# Patient Record
Sex: FEMALE | Race: WHITE
[De-identification: names, ages, dates, MRNs, and addresses within clinical notes are randomized per-mention and may not be internally consistent; named-entity substitution may affect disease eponyms.]

---

## 2018-08-10 ENCOUNTER — HOSPITAL ENCOUNTER (EMERGENCY)
Dept: HOSPITAL 45 - C.EDB | Age: 38
Discharge: STILL ADMITTED OR ADMINISTRATIVE | End: 2018-08-10
Payer: COMMERCIAL

## 2018-08-10 VITALS
DIASTOLIC BLOOD PRESSURE: 82 MMHG | SYSTOLIC BLOOD PRESSURE: 115 MMHG | HEART RATE: 65 BPM | TEMPERATURE: 97.16 F | OXYGEN SATURATION: 96 %

## 2018-08-10 VITALS
HEART RATE: 61 BPM | DIASTOLIC BLOOD PRESSURE: 88 MMHG | OXYGEN SATURATION: 97 % | SYSTOLIC BLOOD PRESSURE: 135 MMHG | TEMPERATURE: 97.34 F

## 2018-08-10 VITALS — OXYGEN SATURATION: 98 %

## 2018-08-10 VITALS
HEIGHT: 65.98 IN | HEIGHT: 65.98 IN | BODY MASS INDEX: 36.53 KG/M2 | WEIGHT: 227.3 LBS | WEIGHT: 227.3 LBS | BODY MASS INDEX: 36.53 KG/M2

## 2018-08-10 DIAGNOSIS — O03.9: Primary | ICD-10-CM

## 2018-08-10 DIAGNOSIS — Z88.2: ICD-10-CM

## 2018-08-10 DIAGNOSIS — Z88.8: ICD-10-CM

## 2018-08-10 LAB
ALBUMIN SERPL-MCNC: 3.6 GM/DL (ref 3.4–5)
ALP SERPL-CCNC: 45 U/L (ref 45–117)
ALT SERPL-CCNC: 37 U/L (ref 12–78)
AST SERPL-CCNC: 22 U/L (ref 15–37)
BASOPHILS # BLD: 0.02 K/UL (ref 0–0.2)
BASOPHILS NFR BLD: 0.2 %
BUN SERPL-MCNC: 15 MG/DL (ref 7–18)
CALCIUM SERPL-MCNC: 9.1 MG/DL (ref 8.5–10.1)
CO2 SERPL-SCNC: 24 MMOL/L (ref 21–32)
CREAT SERPL-MCNC: 0.92 MG/DL (ref 0.6–1.2)
EOS ABS #: 0.22 K/UL (ref 0–0.5)
EOSINOPHIL NFR BLD AUTO: 238 K/UL (ref 130–400)
GLUCOSE SERPL-MCNC: 83 MG/DL (ref 70–99)
HCT VFR BLD CALC: 40.7 % (ref 37–47)
HGB BLD-MCNC: 14.1 G/DL (ref 12–16)
IG#: 0.02 K/UL (ref 0–0.02)
IMM GRANULOCYTES NFR BLD AUTO: 20.6 %
INR PPP: 0.9 (ref 0.9–1.1)
LYMPHOCYTES # BLD: 1.77 K/UL (ref 1.2–3.4)
MCH RBC QN AUTO: 31.3 PG (ref 25–34)
MCHC RBC AUTO-ENTMCNC: 34.6 G/DL (ref 32–36)
MCV RBC AUTO: 90.2 FL (ref 80–100)
MONO ABS #: 0.76 K/UL (ref 0.11–0.59)
MONOCYTES NFR BLD: 8.8 %
NEUT ABS #: 5.8 K/UL (ref 1.4–6.5)
NEUTROPHILS # BLD AUTO: 2.6 %
NEUTROPHILS NFR BLD AUTO: 67.6 %
PMV BLD AUTO: 8.9 FL (ref 7.4–10.4)
POTASSIUM SERPL-SCNC: 3.7 MMOL/L (ref 3.5–5.1)
PROT SERPL-MCNC: 7 GM/DL (ref 6.4–8.2)
PTT PATIENT: 25.6 SECONDS (ref 21–31)
RED CELL DISTRIBUTION WIDTH CV: 12.9 % (ref 11.5–14.5)
RED CELL DISTRIBUTION WIDTH SD: 42.7 FL (ref 36.4–46.3)
SODIUM SERPL-SCNC: 138 MMOL/L (ref 136–145)
WBC # BLD AUTO: 8.59 K/UL (ref 4.8–10.8)

## 2018-08-10 NOTE — OPERATIVE REPORT
DATE OF ADMISSION:  08/10/2018

 

PREOPERATIVE DIAGNOSIS:  Incomplete .

 

POSTOPERATIVE DIAGNOSIS:  Same.

 

PROCEDURE:  Dilatation and evacuation.

 

SURGEON:  Dave Mcmahon MD

 

ASSISTANT:  None.

 

ESTIMATED BLOOD LOSS:  10 mL.

 

TOTAL FLUIDS:  900 mL.

 

URINE OUTPUT:  200 mL.

 

FINDINGS:  Products of conception.

 

CLINICAL HISTORY:  The patient is a 38-year-old female approximately 7-8

weeks pregnant who was seen in the ER for heavy vaginal bleeding.  The

patient had an ultrasound last week revealing no fetal heart tones with a

diagnosis of a failed early pregnancy.  She was managed expectantly.  She

came into the ER bleeding heavily.  Her hCG was still positive.  An

ultrasound revealed evidence of retained products.  Patient was brought to

the OR for dilatation and evacuation.  Time-out was called prior to the start

of the procedure and no antibiotics were given.

 

DESCRIPTION OF PROCEDURE:  After satisfactory general anesthesia, the patient

was prepped and draped in the usual sterile fashion.  She was placed in the

dorsal lithotomy position.  Catheter was used to empty the bladder of 200 mL

of clear urine.  Exam under anesthesia revealed the uterus to be

approximately 8 weeks size.  The cervix was open.  A #8 curved curette was

introduced suctioning out and curetting out products of conception and a

sharp endometrial curette was then introduced curetting out minimal amounts

of tissue.  No active bleeding was noted at the end of the procedure. 

Pitocin was started in the IV.  At the end of the procedure, all remaining

sponge and instrument counts were correct.  All instruments were removed from

the vagina.  Estimated blood loss 10 mL.  Products of conception submitted to

pathology as a separate specimen.  The patient was then placed upon a

stretcher and taken to recovery room in stable condition where she will be

discharged later today.

## 2018-08-10 NOTE — ANESTHESIOLOGY PROGRESS NOTE
Anesthesia Post Op Note


Date & Time


Aug 10, 2018 at 23:07





Vital Signs


Pain Intensity:  4





Vital Signs Past 12 Hours








  Date Time  Temp Pulse Resp B/P (MAP) Pulse Ox O2 Delivery O2 Flow Rate FiO2


 


8/10/18 23:00  61 18 113/76 99 Room Air  


 


8/10/18 22:50  67 18 122/76 99 Room Air  


 


8/10/18 22:40  80 16 123/79 99 Room Air  


 


8/10/18 22:34 36.2 79 16 141/78 99 Room Air  


 


8/10/18 20:49  84 18 114/65 98   


 


8/10/18 18:58  68 18 116/70 97 Room Air  


 


8/10/18 17:19  75  105/76 99 Room Air  


 


8/10/18 17:18     100 Room Air  


 


8/10/18 13:59 36.6 84 18 134/88 98 Room Air  











Notes


Mental Status:  alert / awake / arousable, participated in evaluation


Pt Amnestic to Procedure:  Yes


Nausea / Vomiting:  adequately controlled


Pain:  adequately controlled


Airway Patency, RR, SpO2:  stable & adequate


BP & HR:  stable & adequate


Hydration State:  stable & adequate


Anesthetic Complications:  no major complications apparent

## 2018-08-10 NOTE — [facility] POST OPERATIVE BRIEF NOTE
Immediate Operative Summary


Operative Date


Aug 10, 2018.





Pre-Operative Diagnosis





Incomplete 





Post-Operative Diagnosis





Incomplete 





Procedure(s) Performed





Dilation and Evacuation





Surgeon


Dr. Mcmahon





Assistant Surgeon(s)


None





Estimated Blood Loss


10cc





Findings


Consistent with Post-Op Diagnosis





Specimens





A. Products of conception





Drains


None





Anesthesia Type


General





Complication(s)


none





Disposition


Accompanied Pt To Recover:  yes


Disposition:  Recovery Room / PACU





Overlapping Procedure


I was present for:  the critical portions of procedure.


I was immediately available:  during the entire case


Back up surgeon:  was not required during procedure

## 2018-08-10 NOTE — DISCHARGE INSTRUCTIONS
Discharge Instructions


Date of Service


Aug 10, 2018.





Admission


Reason for Admission:  Excessive Bleeding Due To Miscarriage





Discharge


Discharge Diagnosis / Problem:  incomplete 





Discharge Goals


Goal(s):  Routine recovery after surgery





Activity Recommendations


Activity Limitations:  as noted below


Lifting Limitations:  no more than 10 pounds


Exercise/Sports Limitations:  gradually increase as tolerated


May Resume Sexual Activity:  after follow-up appointment


Shower/Bathe:  no limitations


Driving or Machine Use:  resume 1 day after discharge





.





Current Hospital Diet





ACTIVITY RECOMMENDATIONS:





*  Avoid tampons, douching, hot tubs, pools, and intercourse until bleeding has 

stopped.





*  May shower as usual.





*  No strenuous activity for 24-48 hours. After 24-48 hours, you can do 

anything you feel 


   like doing (driving and sports are okay).








RETURN TO SCHOOL/WORK:





*  You may return to school or work after 24 hours unless specified by your 

physician.








DIET:





*  Resume previous diet.








MEDICATIONS:





Resume previous medications unless instructed otherwise by your surgeon.





Ibuprofen 200mg 2-3 tablets every 4-6 hours as needed


    --OR--


Aleve 2 tablets every 8-12 hours as needed for post-operative discomfort





Medications are over the counter. Tylenol may be used if above medications


are contraindicated or not preferred. Medication should be taken with food or


milk. do not take on an empty stomach.








SPECIAL CARE INSTRUCTIONS:





*  Check temperature twice daily for one week. Report any elevation over 101 

degrees.





*  Call office if you experience increased pelvic pain or discomfort not 

relieved by pain


   medicine, if you have foul smelling vaginal discharge, if you have bleeding 

that is 


   heavier than a normal menstrual flow. If you are changing a maxi pad every 1-

2 hours,


   this is too heavy. vaginal spotting is normal for 1-2 weeks.








FOLLOW UP VISIT:





Call your doctor's office for a post-operative visit.





Patient's current hospital diet:





Discharge Diet


Recommended Diet:  Regular Diet


Fluid Restriction:  None





Procedures


Procedures Performed:  


Dilation and Evacuation





Pending Studies


Studies pending at discharge:  no





Medical Emergencies








.


Who to Call and When:





Medical Emergencies:  If at any time you feel your situation is an emergency, 

please call 911 immediately.





.





Non-Emergent Contact


Non-Emergency issues call your:  Primary Care Provider





.


.








"Provider Documentation" section prepared by Dave Mcmahon.








.

## 2018-08-10 NOTE — EMERGENCY ROOM VISIT NOTE
ED Visit Note


First contact with patient:  14:36


CHIEF  COMPLAINT: Heavy vaginal bleeding, miscarriage





HISTORY OF PRESENTING ILLNESS: This is a 38-year-old female  who presented 

to the emergency department with heavy vaginal bleeding that started at around 

11 AM today.  She states that she believes she is having a miscarriage.  She 

states that she is about 7-8 weeks pregnant, had an ultrasound 1 week ago on 8/

3 that showed no fetal heart activity, and her OB suspected fetal demise.  She 

states that she has been having light spotting for over a week, which started 

to become heavier about 5 days ago with cramping and passing small clots.  

Today she began to experience large gushes of blood and large clots, states she 

is soaking her pad every 20 minutes.  She states she called her OB office and 

they advised her to come to the emergency department for further evaluation.  

She is having significant abdominal cramping and pelvic pain, at its worst she 

has rated at 8/10, though she states it has subsided and is currently rating as 

3/10.  She denies any fevers, chills, or malaise.  She denies any chest pain, 

shortness of breath, dizziness or syncope.





REVIEW OF SYSTEMS: A complete 10 point review of systems was reviewed with the 

patient with pertinent positives and negatives as per history of present 

illness. All else were negative.





PAST MEDICAL HISTORY: Hypothyroidism.





SOCIAL HISTORY: Lives at home.  She denies tobacco use.





ALLERGIES: Reviewed in chart, see below.





PHYSICAL EXAM:


CONSTITUTIONAL: Pleasant and cooperative.  No acute distress.  No pallor.  

Mildly dehydrated, but otherwise well appearing and well nourished. 


HEENT: Normocephalic, atraumatic. Pupils equal, round and reactive to light, 

EOMI. TMs normal. Pharynx normal.  Tacky mucous membranes.


NECK: Supple, full active range of motion without discomfort.


RESPIRATORY: Clear to auscultation bilaterally with no wheezing, crackles, 

rhonchi or stridor. Equal expansion bilaterally.


CARDIOVASCULAR: Regular rate and rhythm with no murmurs, rubs or gallops. 

Normal peripheral perfusion. No edema.


GASTROINTESTINAL: Soft, nontender, nondistended. No palpable masses or HSM. 

Bowel sounds present in all quadrants. 


PELVIC EXAM:  VULVA: No ulcers, vesicles or atrophy.  VAGINA: Moderate amount 

of dark red blood and clots in the vaginal vault, no foul odor, no abnormal 

discharge.  CERVIX: Os is open to 2 finger-widths, no clots or tissue noted in 

the os, active bleeding from the os.  Cervix is pink, nontender, no cervical 

motion tenderness, no discharge.  UTERUS: Slightly enlarged, mildly tender to 

palpation.  ADNEXA: No masses or tenderness.  A nurse was present as a 

chaperone during the examination.


MUSCULOSKELETAL: Full range of motion of all joints without discomfort.


INTEGUMENTARY: No rash or other significant dermatologic conditions noted.


NEUROLOGIC: Alert and oriented X 4 with normal affect.  Normal strength and 

sensation in all 4 extremities.  No focal neurologic deficits noted.  Normal 

speech.  Normal gait observed.





ED COURSE AND MEDICAL DECISION MAKING: 





CC: Patient presenting with complaint of heavy vaginal bleeding,  suspected 

miscarriage





DIFFERENTIAL DIAGNOSIS:  Includes, but not limited to incomplete miscarriage 

versus complete miscarriage, retained product of conception, anemia, dehydration

, among others. 





INTERPRETATION OF LABS: No leukocytosis, no anemia, normal platelets, no 

significant electrolyte abnormalities, normal renal function, normal liver and.

  Coagulation factors within normal limits.  Beta hCG quant significantly 

elevated.  UA notable for blood, negative for infection.





IMAGING:  


PELVIC ULTRASOUND





CLINICAL HISTORY: Evaluate for retained products of conception. Miscarriage.    





COMPARISON STUDY:  No previous studies for comparison.





TECHNIQUE: Transabdominal and transvaginal sonography of the pelvis was


performed.





FINDINGS: The uterus measures 9.5 x 5.7 x 7.2 cm. The endometrium is thickened


and heterogeneous, measuring 2.1 cm in thickness. There is increased vascularity


within the endometrium. No normal-appearing intrauterine gestational sac is


noted. Several echogenic foci within the endometrium are noted. The ovaries are


normal. The right measures 3.3 x 2.4 x 2.2 cm the left measures 2.6 x 1.8 x 1.5


cm. There is color flow within each ovary. Small amount of free fluid within the


pelvis is noted.





IMPRESSION:  Thickened, hypervascular and heterogeneous endometrium which


measures 2.1 cm. The findings suggest retained products of conception.








MEDICATION RECONCILIATION:  I attest that I have personally reviewed the patient

's current medication list.





INITIAL VITAL SIGNS REVIEW:  I reviewed the patient's initial vital signs and 

interpret them as follows: T: Afebrile;  BP: Hypertensive;  HR: Within normal 

limits;  RR: Within normal limits;  Pulse Ox: Within normal limits on room air.


Blood pressure screening: The patient was found to have an elevated blood 

pressure, which was felt to be situational.  





SUMMARY: 


Patient was evaluated at bedside, history and physical exam performed.


Patient is alert and oriented, in no acute distress, resting calmly in 

stretcher.


Patient is not tachycardic and does not appear to be pale on exam.


Mild tenderness to palpation in the lower abdomen/pelvic area, abdomen is 

otherwise nontender.  No acute abdomen.


Review of the patient's chart was performed, noting blood type A positive.


Orders were placed at bedside for labs, UA, IV fluids for hydration, 

transvaginal ultrasound to evaluate for retained products of conception/

incomplete miscarriage.


Patient discussed with Dr. Connolly, who agrees with my assessment and plan.


Pelvic exam was performed as above, noting an open cervix with moderate amount 

of blood and clots in the vaginal vault, no active hemorrhaging.


Labs and imaging reviewed as above.  Ultrasound consistent with retained 

products of conception.


I spoke on the phone with Dr. Mcmahon, OB/GYN, who agrees to evaluate the patient 

and will most likely take her to the OR for D&C today.  Type and screen ordered 

per his request


Patient reassessed multiple times throughout ED stay, she has remained stable, 

and her pain is well controlled.


Patient was updated on all results and plan for admission and surgery, she 

verbalized understanding and was agreeable to this plan.


Patient was stable at time of admission.


Current/Historical Medications


Scheduled


Acetaminophen (Tylenol), 1 TAB PO Q8


Ibuprofen (Ibuprofen), 1 TAB PO Q6H


Levothyroxine Sodium (Levothyroxine Sodium), 150 MCG PO DAILY





Scheduled PRN


Ranitidine (Zantac), 1 TAB PO BID PRN for HEART BURN





Allergies


Coded Allergies:  


     Varenicline (Verified  Allergy, Mild, HYSTERIA, 8/10/18)


     Cephalexin (Verified  Allergy, Unknown, SWELLING, 8/10/18)


     Sulfa Antibiotics (Verified  Allergy, Unknown, RASH, 8/10/18)





Vital Signs











  Date Time  Temp Pulse Resp B/P (MAP) Pulse Ox O2 Delivery O2 Flow Rate FiO2


 


8/10/18 23:17 36.2 65 16 115/82 96 Room Air  


 


8/10/18 23:10 36.4 62 18 122/84 94 Room Air  


 


8/10/18 23:00  61 18 113/76 99 Room Air  


 


8/10/18 22:50  67 18 122/76 99 Room Air  


 


8/10/18 22:40  80 16 123/79 99 Room Air  


 


8/10/18 22:34 36.2 79 16 141/78 99 Room Air  


 


8/10/18 20:49  84 18 114/65 98   


 


8/10/18 18:58  68 18 116/70 97 Room Air  


 


8/10/18 17:19  75  105/76 99 Room Air  


 


8/10/18 17:18     100 Room Air  


 


8/10/18 13:59 36.6 84 18 134/88 98 Room Air  











Laboratory Results


8/10/18 15:15








Red Blood Count 4.51, Mean Corpuscular Volume 90.2, Mean Corpuscular Hemoglobin 

31.3, Mean Corpuscular Hemoglobin Concent 34.6, Mean Platelet Volume 8.9, 

Neutrophils (%) (Auto) 67.6, Lymphocytes (%) (Auto) 20.6, Monocytes (%) (Auto) 

8.8, Eosinophils (%) (Auto) 2.6, Basophils (%) (Auto) 0.2, Neutrophils # (Auto) 

5.80, Lymphocytes # (Auto) 1.77, Monocytes # (Auto) 0.76, Eosinophils # (Auto) 

0.22, Basophils # (Auto) 0.02





8/10/18 15:15

















Test


  8/10/18


15:15 8/10/18


17:15


 


White Blood Count


  8.59 K/uL


(4.8-10.8) 


 


 


Red Blood Count


  4.51 M/uL


(4.2-5.4) 


 


 


Hemoglobin


  14.1 g/dL


(12.0-16.0) 


 


 


Hematocrit 40.7 % (37-47)  


 


Mean Corpuscular Volume


  90.2 fL


() 


 


 


Mean Corpuscular Hemoglobin


  31.3 pg


(25-34) 


 


 


Mean Corpuscular Hemoglobin


Concent 34.6 g/dl


(32-36) 


 


 


Platelet Count


  238 K/uL


(130-400) 


 


 


Mean Platelet Volume


  8.9 fL


(7.4-10.4) 


 


 


Neutrophils (%) (Auto) 67.6 %  


 


Lymphocytes (%) (Auto) 20.6 %  


 


Monocytes (%) (Auto) 8.8 %  


 


Eosinophils (%) (Auto) 2.6 %  


 


Basophils (%) (Auto) 0.2 %  


 


Neutrophils # (Auto)


  5.80 K/uL


(1.4-6.5) 


 


 


Lymphocytes # (Auto)


  1.77 K/uL


(1.2-3.4) 


 


 


Monocytes # (Auto)


  0.76 K/uL


(0.11-0.59) 


 


 


Eosinophils # (Auto)


  0.22 K/uL


(0-0.5) 


 


 


Basophils # (Auto)


  0.02 K/uL


(0-0.2) 


 


 


RDW Standard Deviation


  42.7 fL


(36.4-46.3) 


 


 


RDW Coefficient of Variation


  12.9 %


(11.5-14.5) 


 


 


Immature Granulocyte % (Auto) 0.2 %  


 


Immature Granulocyte # (Auto)


  0.02 K/uL


(0.00-0.02) 


 


 


Prothrombin Time


  9.4 SECONDS


(9.0-12.0) 


 


 


Prothromb Time International


Ratio 0.9 (0.9-1.1) 


  


 


 


Activated Partial


Thromboplast Time 25.6 SECONDS


(21.0-31.0) 


 


 


Partial Thromboplastin Ratio 1.0  


 


Anion Gap


  9.0 mmol/L


(3-11) 


 


 


Est Creatinine Clear Calc


Drug Dose 99.5 ml/min 


  


 


 


Estimated GFR (


American) 90.9 


  


 


 


Estimated GFR (Non-


American 78.4 


  


 


 


BUN/Creatinine Ratio 16.8 (10-20)  


 


Calcium Level


  9.1 mg/dl


(8.5-10.1) 


 


 


Total Bilirubin


  0.6 mg/dl


(0.2-1) 


 


 


Aspartate Amino Transf


(AST/SGOT) 22 U/L (15-37) 


  


 


 


Alanine Aminotransferase


(ALT/SGPT) 37 U/L (12-78) 


  


 


 


Alkaline Phosphatase


  45 U/L


() 


 


 


Total Protein


  7.0 gm/dl


(6.4-8.2) 


 


 


Albumin


  3.6 gm/dl


(3.4-5.0) 


 


 


Globulin


  3.4 gm/dl


(2.5-4.0) 


 


 


Albumin/Globulin Ratio 1.1 (0.9-2)  


 


Human Chorionic Gonadotropin,


Quant 30170 mIU/mL 


  


 


 


Urine Color  YELLOW 


 


Urine Appearance  CLEAR (CLEAR) 


 


Urine pH  6.0 (4.5-7.5) 


 


Urine Specific Gravity


  


  1.010


(1.000-1.030)


 


Urine Protein  NEG (NEG) 


 


Urine Glucose (UA)  NEG (NEG) 


 


Urine Ketones  NEG (NEG) 


 


Urine Occult Blood  3+ (NEG) 


 


Urine Nitrite  NEG (NEG) 


 


Urine Bilirubin  NEG (NEG) 


 


Urine Urobilinogen  NEG (NEG) 


 


Urine Leukocyte Esterase  NEG (NEG) 


 


Urine WBC (Auto)  0 /hpf (0-5) 


 


Urine RBC (Auto)  >30 /hpf (0-4) 


 


Urine Hyaline Casts (Auto)  1-5 /lpf (0-5) 


 


Urine Epithelial Cells (Auto)


  


  10-20 /lpf


(0-5)


 


Urine Bacteria (Auto)  NEG (NEG) 











Medications Administered











 Medications


  (Trade)  Dose


 Ordered  Sig/Joe


 Route  Start Time


 Stop Time Status Last Admin


Dose Admin


 


 Sodium Chloride  1,000 ml @ 


 999 mls/hr  Q1H1M STAT


 IV  8/10/18 15:03


 8/10/18 16:03 DC 8/10/18 15:03


999 MLS/HR


 


 Ketorolac


 Tromethamine


  (Toradol Inj)  30 mg  Q6H  PRN


 IV.  8/10/18 22:45


 18 22:44  8/10/18 22:58


30 MG











Departure Information


Prescriptions





Ibuprofen (Ibuprofen) 600 Mg Tab


1 TAB PO Q6H for Pain, #30 % 2 Refills


   Prov: Dave Mcmahon M.D.         8/10/18





Referrals


Juan Grossman M.D. (PCP)





Patient Instructions


My [facility]

## 2018-08-10 NOTE — DIAGNOSTIC IMAGING REPORT
PELVIC ULTRASOUND



CLINICAL HISTORY: Evaluate for retained products of conception. Miscarriage.    



COMPARISON STUDY:  No previous studies for comparison.



TECHNIQUE: Transabdominal and transvaginal sonography of the pelvis was

performed.



FINDINGS: The uterus measures 9.5 x 5.7 x 7.2 cm. The endometrium is thickened

and heterogeneous, measuring 2.1 cm in thickness. There is increased vascularity

within the endometrium. No normal-appearing intrauterine gestational sac is

noted. Several echogenic foci within the endometrium are noted. The ovaries are

normal. The right measures 3.3 x 2.4 x 2.2 cm the left measures 2.6 x 1.8 x 1.5

cm. There is color flow within each ovary. Small amount of free fluid within the

pelvis is noted.



IMPRESSION:  Thickened, hypervascular and heterogeneous endometrium which

measures 2.1 cm. The findings suggest retained products of conception.









Electronically signed by:  Michi Grover M.D.

8/10/2018 5:15 PM



Dictated Date/Time:  8/10/2018 5:00 PM

## 2018-08-10 NOTE — HISTORY & PHYSICAL EXAMINATION
DATE OF ADMISSION:  08/10/2018

 

CHIEF COMPLAINT:  Heavy vaginal bleeding, spontaneous miscarriage.

 

HISTORY OF PRESENT ILLNESS:  The patient is a 38-year-old white female

 2, para 1-0-0-1, approximately 7-8 weeks' pregnant.  She had an

ultrasound about a week ago with no fetal heart activity.  She was being

treated conservatively with watchful waiting.  The patient states that she

had heavy bleeding today and large clots, passing golf ball size plus clots

with severe cramping.  She is not dizzy, short of breath or lightheaded.  She

is hemodynamically stable.

 

PAST MEDICAL HISTORY:  Significant for hypothyroidism.

 

PAST SURGICAL HISTORY:  Positive for dental surgery and orthopedic surgery.

 

MEDICATIONS:  Include Synthroid 150 mcg daily, took her dose today.

 

SOCIAL HISTORY:  Past history of smoking, however, denies smoking, alcohol or

drug use currently.

 

REVIEW OF SYSTEMS:  Negative.

 

FAMILY HISTORY:  Noncontributory.

 

ALLERGIES:  KEFLEX, SULFA AND VARENICLINE

 

PHYSICAL EXAMINATION:

HEENT:  Within normal limits.

LUNGS:  Clear to auscultation.

COR:  Regular rate and rhythm.

ABDOMEN:  Soft, nontender.

PELVIC:  Reveals the os to be open.

 

LABORATORY DATA:  Beta hCG is 13,966.  Blood type is pending.  Hemoglobin is

14.1, hematocrit 40.7, normal white count.

 

Ultrasound reveals thickened hypervascular heterogeneous endometrium

measuring 2.1 cm with retained products of conception.

 

ASSESSMENT:  Incomplete .

 

PLAN:  D&E in OR.  The patient given consent and the procedure has been

documented with the patient in detail. We will take patient to the OR

emergently.

 

 

BRANDEN